# Patient Record
Sex: FEMALE | Race: BLACK OR AFRICAN AMERICAN
[De-identification: names, ages, dates, MRNs, and addresses within clinical notes are randomized per-mention and may not be internally consistent; named-entity substitution may affect disease eponyms.]

---

## 2020-08-13 ENCOUNTER — HOSPITAL ENCOUNTER (OUTPATIENT)
Dept: HOSPITAL 74 - FMAMMOTONE | Age: 85
Discharge: HOME | End: 2020-08-13
Attending: INTERNAL MEDICINE
Payer: COMMERCIAL

## 2020-08-13 DIAGNOSIS — D24.2: Primary | ICD-10-CM

## 2020-08-13 DIAGNOSIS — N64.9: ICD-10-CM

## 2020-08-13 DIAGNOSIS — R92.1: ICD-10-CM

## 2020-08-13 PROCEDURE — A4648 IMPLANTABLE TISSUE MARKER: HCPCS

## 2020-08-13 PROCEDURE — 0HBU3ZX EXCISION OF LEFT BREAST, PERCUTANEOUS APPROACH, DIAGNOSTIC: ICD-10-PCS

## 2020-08-14 NOTE — PATH
Surgical Pathology Report



Patient Name:  DORINDA GONZALEZ

Accession #:  [id number]

[facility]. Rec. #:  [id number]                                                        

   /Age/Gender:  1935 (Age: 84) / F

Account:  [id number]                                                          

             Location: [facility]

Taken:  2020

Received:  2020

Reported:  2020

Physicians:  VIVIANA Basurto M.D.

  



Specimen(s) Received

A: LEFT BREAST SPECIMEN WITH CALCIFICATIONS 

B: LEFT BREAST SPECIMEN WITHOUT CALCIFICATIONS 





Clinical History

Nonpalpable lesion

Mammographic findings: Microcalcification, suspicious







Final Diagnosis

A. BREAST, LEFT, WITH CALCIFICATIONS, STEREOTACTIC CORE BIOPSY:

FIBROADENOMA WITH ASSOCIATED MICROCALCIFICATIONS.



B. BREAST, LEFT, WITHOUT CALCIFICATIONS, STEREOTACTIC CORE BIOPSY:

BENIGN BREAST PARENCHYMA.







***Electronically Signed***

Sita Rudd M.D.





Gross Description

A. Received in formalin labeled "left breast with calcifications," is a 3.5 x

2.5 x 0.3 cm aggregate of multiple tan-yellow, irregular to cylindrical portions

of fibroadipose tissue. The formalin is filtered and the specimen is entirely

submitted in 2 cassettes.



B. Received in formalin labeled "left breast without calcifications," is a 1.5 x

1.1 x 0.2 cm aggregate of multiple tan-yellow, irregular to cylindrical portions

of fibroadipose tissue. The formalin is filtered and the specimen is entirely

submitted in one cassette.



Time to formalin fixation: 5 minutes

Total formalin fixation time: Approximately 6 hours.





[facility]